# Patient Record
Sex: MALE | Race: ASIAN | Employment: FULL TIME | URBAN - METROPOLITAN AREA
[De-identification: names, ages, dates, MRNs, and addresses within clinical notes are randomized per-mention and may not be internally consistent; named-entity substitution may affect disease eponyms.]

---

## 2017-12-22 ENCOUNTER — RADIANT APPOINTMENT (OUTPATIENT)
Dept: MRI IMAGING | Facility: CLINIC | Age: 22
End: 2017-12-22
Attending: PHYSICIAN ASSISTANT
Payer: COMMERCIAL

## 2017-12-22 DIAGNOSIS — M54.2 NECK PAIN: ICD-10-CM

## 2021-01-06 PROBLEM — N64.89 FULLNESS OF BREAST: Status: ACTIVE | Noted: 2020-12-24

## 2021-01-07 ENCOUNTER — OFFICE VISIT (OUTPATIENT)
Dept: FAMILY MEDICINE | Facility: CLINIC | Age: 26
End: 2021-01-07
Payer: COMMERCIAL

## 2021-01-07 VITALS
BODY MASS INDEX: 19.7 KG/M2 | OXYGEN SATURATION: 98 % | TEMPERATURE: 97 F | HEIGHT: 69 IN | RESPIRATION RATE: 18 BRPM | SYSTOLIC BLOOD PRESSURE: 124 MMHG | DIASTOLIC BLOOD PRESSURE: 80 MMHG | HEART RATE: 59 BPM | WEIGHT: 133 LBS

## 2021-01-07 DIAGNOSIS — N64.4 BREAST PAIN, LEFT: Primary | ICD-10-CM

## 2021-01-07 DIAGNOSIS — Z23 NEED FOR PROPHYLACTIC VACCINATION AND INOCULATION AGAINST INFLUENZA: ICD-10-CM

## 2021-01-07 DIAGNOSIS — N64.89 FULLNESS OF BREAST: ICD-10-CM

## 2021-01-07 PROCEDURE — 99203 OFFICE O/P NEW LOW 30 MIN: CPT | Mod: 25 | Performed by: STUDENT IN AN ORGANIZED HEALTH CARE EDUCATION/TRAINING PROGRAM

## 2021-01-07 PROCEDURE — 90471 IMMUNIZATION ADMIN: CPT | Performed by: STUDENT IN AN ORGANIZED HEALTH CARE EDUCATION/TRAINING PROGRAM

## 2021-01-07 PROCEDURE — 90686 IIV4 VACC NO PRSV 0.5 ML IM: CPT | Performed by: STUDENT IN AN ORGANIZED HEALTH CARE EDUCATION/TRAINING PROGRAM

## 2021-01-07 RX ORDER — MULTIPLE VITAMINS W/ MINERALS TAB 9MG-400MCG
1 TAB ORAL DAILY
COMMUNITY
End: 2022-03-10

## 2021-01-07 ASSESSMENT — MIFFLIN-ST. JEOR: SCORE: 1565.72

## 2021-01-07 NOTE — PROGRESS NOTES
"  Assessment & Plan     Need for prophylactic vaccination and inoculation against influenza  - INFLUENZA VACCINE IM > 6 MONTHS VALENT IIV4 [21574]    Breast pain, left  Fullness of breast  Ongoing left pectoral fullness and intermittent discomfort in the pectoral area since one month ago. He is primarily concerned about gynecomastia. I can appreciate some minor fullness of the left lower pectoral region lateral to the areola. I did not palpate a discrete mass today. Hormonal testing has thus far been unremarkable. His exam is not consistent with classic gynecomastia, however cannot rule this out. Will proceed with breast imaging. Further management pending imaging results.   - US Breast Left Complete 4 Quadrants  - HCG tumor marker  - Estradiol      Review of external notes as documented above     40 minutes spent on the date of the encounter doing chart review, history and exam, documentation and further activities as noted above    Mary Gordillo MD  [facility]    Ayesha Ruiz is a 25 year old who presents to clinic today for the following health issues:    HPI     Pectoral concern: One month ago, patient noted swelling around his left pectoral muscle.   At first area seemed as if it was \"contouring out\", and then \"pressure\" began.  He has felt an uncomfortable pressure in the pectoral muscle intermittently.   He was seen by [facility] [place] on 12/16 and had lab work completed--prolactin, CBC, testosterone, gonadotropin, LH, TSH--that were unremarkable.  He lacked insurance at that time so didn't complete imaging but he would like that done now.  Estrogen level is pending.   Since then, the pressure sensation has mostly resolved but he still notes it on occasion. It is unrelated to exercise.   He has not had shortness of breath, exertional chest pain, fever, leg swelling, vomiting.   He has had intermittent nausea for a couple weeks unrelated to the pressure " "episodes.   He also feels a \"band of tissue\" running through the area of swelling.  He is worried about gynecomastia.  No testicular pain or swelling.        Objective    /80   Pulse 59   Temp 97  F (36.1  C) (Tympanic)   Resp 18   Ht 1.74 m (5' 8.5\")   Wt 60.3 kg (133 lb)   SpO2 98%   BMI 19.93 kg/m    Body mass index is 19.93 kg/m .  Physical Exam   GENERAL: healthy, alert and no distress  EYES: Eyes grossly normal to inspection, PERRL and conjunctivae and sclerae normal  HENT: ear canals and TM's normal, nose and mouth without ulcers or lesions  NECK: no adenopathy, no asymmetry, masses, or scars and thyroid normal to palpation  RESP: lungs clear to auscultation - no rales, rhonchi or wheezes  CV: regular rate and rhythm, normal S1 S2, no S3 or S4, no murmur, click or rub, no peripheral edema and peripheral pulses strong  ABDOMEN: soft, nontender, no hepatosplenomegaly, no masses and bowel sounds normal  MS: no gross musculoskeletal defects noted, no edema  SKIN: no suspicious lesions or rashes  NEURO: Normal strength and tone, mentation intact and speech normal  PSYCH: mentation appears normal, affect normal/bright    Outside labs reviewed and normal: prolactin, CBC, testosterone, gonadotropin, LH, TSH.     "

## 2021-01-12 ENCOUNTER — HOSPITAL ENCOUNTER (OUTPATIENT)
Dept: LAB | Facility: CLINIC | Age: 26
End: 2021-01-12
Attending: STUDENT IN AN ORGANIZED HEALTH CARE EDUCATION/TRAINING PROGRAM
Payer: COMMERCIAL

## 2021-01-12 ENCOUNTER — HOSPITAL ENCOUNTER (OUTPATIENT)
Dept: MAMMOGRAPHY | Facility: CLINIC | Age: 26
End: 2021-01-12
Attending: STUDENT IN AN ORGANIZED HEALTH CARE EDUCATION/TRAINING PROGRAM
Payer: COMMERCIAL

## 2021-01-12 DIAGNOSIS — N64.4 BREAST PAIN, LEFT: ICD-10-CM

## 2021-01-12 LAB — ESTRADIOL SERPL-MCNC: 33 PG/ML (ref 6–50)

## 2021-01-12 PROCEDURE — 82670 ASSAY OF TOTAL ESTRADIOL: CPT | Performed by: STUDENT IN AN ORGANIZED HEALTH CARE EDUCATION/TRAINING PROGRAM

## 2021-01-12 PROCEDURE — 36415 COLL VENOUS BLD VENIPUNCTURE: CPT | Performed by: STUDENT IN AN ORGANIZED HEALTH CARE EDUCATION/TRAINING PROGRAM

## 2021-01-12 PROCEDURE — 84702 CHORIONIC GONADOTROPIN TEST: CPT | Performed by: STUDENT IN AN ORGANIZED HEALTH CARE EDUCATION/TRAINING PROGRAM

## 2021-01-12 PROCEDURE — 76642 ULTRASOUND BREAST LIMITED: CPT | Mod: LT

## 2021-01-12 NOTE — LETTER
Javy Ruiz  [address] [place] [address]            January 12, 2021    Date of Exam:     Dear Javy:    Thank you for your recent visit.     Breast Imaging Result: We are pleased to inform you that the results of your recent breast imaging show no evidence of malignancy (cancer). Please check with your health care team for instructions on follow-up based on your medical history and physical exam findings.    Remember that negative breast imaging does not exclude the possibility of breast disease.  You should never ignore a breast lump or any other change in your breasts, even if the breast imaging is normal.  If you are experiencing any breast problems such as a lump or localized pain we request that you discuss this with your health care team if you haven t already done so, as additional testing may be necessary.    A report of your breast imaging results was sent to: Mary Gordillo    Your breast imaging will become part of your medical file here at [facility] for at least 10 years. You are responsible for informing any new health care team or breast imaging facility of the date and location of this examination.     We appreciate the opportunity to participate in your health care.    Sincerely,  Tommy San MD  [facility]

## 2021-01-12 NOTE — RESULT ENCOUNTER NOTE
Leonard Palafox,    Ultrasound results look good. Looks like they saw a collection of adipose tissue where that lump is that you had noticed but no gynecomastia. I would recommend ongoing observation at this point. If you are still having chest discomfort, though, then I think you should come back into clinic to have that re assessed. Please schedule an appointment in that case. Let us know if you have questions.    Mary Gordillo MD

## 2021-01-13 LAB — HCG-TM SERPL-ACNC: <3 IU/L

## 2021-01-15 ENCOUNTER — HEALTH MAINTENANCE LETTER (OUTPATIENT)
Age: 26
End: 2021-01-15

## 2021-01-21 ENCOUNTER — MYC MEDICAL ADVICE (OUTPATIENT)
Dept: FAMILY MEDICINE | Facility: CLINIC | Age: 26
End: 2021-01-21

## 2021-01-21 DIAGNOSIS — N64.89 FULLNESS OF BREAST: ICD-10-CM

## 2021-01-21 DIAGNOSIS — E34.9 ABNORMALITY OF HORMONE: Primary | ICD-10-CM

## 2021-01-26 DIAGNOSIS — E34.9 ABNORMALITY OF HORMONE: ICD-10-CM

## 2021-01-26 DIAGNOSIS — N64.89 FULLNESS OF BREAST: ICD-10-CM

## 2021-01-26 LAB
ESTRADIOL SERPL-MCNC: 37 PG/ML (ref 6–50)
LH SERPL-ACNC: 7.9 IU/L (ref 1.5–9.3)

## 2021-01-26 PROCEDURE — 82670 ASSAY OF TOTAL ESTRADIOL: CPT | Mod: 90 | Performed by: PATHOLOGY

## 2021-01-26 PROCEDURE — 83002 ASSAY OF GONADOTROPIN (LH): CPT | Mod: 90 | Performed by: PATHOLOGY

## 2021-01-26 PROCEDURE — 99000 SPECIMEN HANDLING OFFICE-LAB: CPT | Performed by: PATHOLOGY

## 2021-01-26 PROCEDURE — 36415 COLL VENOUS BLD VENIPUNCTURE: CPT | Performed by: PATHOLOGY

## 2021-01-26 PROCEDURE — 82672 ASSAY OF ESTROGEN: CPT | Mod: 90 | Performed by: PATHOLOGY

## 2021-02-05 LAB — LAB SCANNED RESULT: NORMAL

## 2021-02-05 NOTE — RESULT ENCOUNTER NOTE
Hi Javy,    Labs have returned and all are in normal range. Are you still having symptoms? Have things improved at all? Please let me know.    Mary Gordillo MD

## 2021-02-15 ENCOUNTER — TELEPHONE (OUTPATIENT)
Dept: FAMILY MEDICINE | Facility: CLINIC | Age: 26
End: 2021-02-15

## 2021-02-15 DIAGNOSIS — N64.4 BREAST PAIN, LEFT: ICD-10-CM

## 2021-02-15 DIAGNOSIS — N64.89 FULLNESS OF BREAST: Primary | ICD-10-CM

## 2021-02-15 NOTE — TELEPHONE ENCOUNTER
Please call patient and let him know I have referred him to the breast specialty center in [place] for consult due to ongoing symptoms.    Thank you.

## 2021-02-15 NOTE — TELEPHONE ENCOUNTER
Pt called today requesting lab results. He was unable to view the total estrogen result. I informed him of result and note from Dr Gordillo.  Pt states he is still having symptoms.  I informed I would let Dr Gordillo know.   casa robert

## 2021-02-16 ENCOUNTER — TELEPHONE (OUTPATIENT)
Dept: ONCOLOGY | Facility: CLINIC | Age: 26
End: 2021-02-16

## 2021-02-16 NOTE — TELEPHONE ENCOUNTER
Oncology/Surgical Oncology Referral Request:     Specialty Requested: Medical Oncology     Referring Provider: Mary Trevino MD    Referring Clinic/Organization: [facility]    Records location: [facility]     Requested Provider (if specified): Not Specified

## 2021-02-16 NOTE — TELEPHONE ENCOUNTER
Left message to call back and ask to speak with an available triage nurse.    Scheduling number for [facility] [place], [place]: [phone].    MANUELA Valente, RN  [facility]

## 2021-02-17 NOTE — TELEPHONE ENCOUNTER
Attempted to reach, unable. Left message  to call back if questions.  He did make appt at breast center for 3/4 which indicates he got RN Yulia's message and made the appt    ViaBillt message sent to pt as well      Mini Osei RN

## 2021-03-04 ENCOUNTER — PRE VISIT (OUTPATIENT)
Dept: ONCOLOGY | Facility: CLINIC | Age: 26
End: 2021-03-04

## 2021-09-13 ENCOUNTER — VIRTUAL VISIT (OUTPATIENT)
Dept: FAMILY MEDICINE | Facility: CLINIC | Age: 26
End: 2021-09-13
Payer: COMMERCIAL

## 2021-09-13 DIAGNOSIS — U07.1 INFECTION DUE TO 2019 NOVEL CORONAVIRUS: Primary | ICD-10-CM

## 2021-09-13 PROCEDURE — 99212 OFFICE O/P EST SF 10 MIN: CPT | Mod: TEL | Performed by: FAMILY MEDICINE

## 2021-09-13 NOTE — PROGRESS NOTES
Javy is a 26 year old who is being evaluated via a billable telephone visit.      What phone number would you like to be contacted at? [phone]  [facility]    Assessment & Plan   1. Infection due to 2019 novel coronavirus: I called patient.  Symptoms improving overall.  Does have headache currently is new symptom.  Manageable with Tylenol and ibuprofen.  Questions answered.  Recommend 10-day quarantine.  As recommended by CDC.  Discourage repeat testing.  Recommend self isolation.  No known diet that helps with Covid.  Likely will bump his immunity; however, but for how long or to what variance is unknown yet.  Discussed typical scolded symptoms and red flag symptoms when to go to the ER.  Patient appreciative.  Follow-up as needed.    Return in about 1 week (around 9/20/2021), or if symptoms worsen or fail to improve.    Jaswinder Moise MD  [facility]    This chart is completed utilizing dictation software; typos and/or incorrect word substitutions may unintentionally occur.       Subjective   Javy is a 26 year old who presents for the following health issues:    HPI   Chief Complaint   Patient presents with     Covid Concern     patient tested positive for covid 9/10/2021 = would like to discuss      Concern for COVID-19 - Tested Positive 9/10/2021  About how many days ago did these symptoms start? 9/9/2021  Is this your first visit for this illness? Yes  In the 14 days before your symptoms started, have you had close contact with someone with COVID-19 (Coronavirus)? I do not know.  Do you have a fever or chills? No  Are you having new or worsening difficulty breathing? No  Do you have new or worsening cough? No does have a cough , not worsening   Have you had any new or unexplained body aches? No    Have you experienced any of the following NEW symptoms?    Headache: YES    Sore throat: No    Loss of taste or smell: No    Chest pain:  No    Diarrhea: No    Rash: No  What treatments have you tried? Tylenol , Ibuprofen ( stopped)   Who do you live with? Friend   Are you, or a household member, a healthcare worker or a ? No  Do you live in a nursing home, group home, or shelter? No  Do you have a way to get food/medications if quarantined? Yes, I have a friend or family member who can help me.    Timeline:    Started getting a cough last Thursday. That night he had trouble sleeping, feeling ill with a fever. Got tested with saliva sample on Saturday. Got his positive results and quarantining. Getting better on Friday. General fatigue Sunday, but now has headache.     Vaccinated with Demetrio and Demetrio    Questions:    What he should be expecting in terms of symptoms -     Any innocent symptoms to watch out for -     Return to work -     Diet to help -    When to go to the ER -    Review of Systems   Constitutional, HEENT, cardiovascular, pulmonary, gi and gu systems are negative, except as otherwise noted.      Objective       Vitals:  No vitals were obtained today due to virtual visit.    Physical Exam   healthy, alert and no distress  PSYCH: Alert and oriented times 3; coherent speech, normal   rate and volume, able to articulate logical thoughts, able   to abstract reason, no tangential thoughts, no hallucinations   or delusions  His affect is normal  RESP: No cough, no audible wheezing, able to talk in full sentences  Remainder of exam unable to be completed due to telephone visits    Labs: none        Phone call duration: 13 minutes

## 2021-10-09 ENCOUNTER — HEALTH MAINTENANCE LETTER (OUTPATIENT)
Age: 26
End: 2021-10-09

## 2022-01-29 ENCOUNTER — HEALTH MAINTENANCE LETTER (OUTPATIENT)
Age: 27
End: 2022-01-29

## 2022-02-28 ENCOUNTER — HOSPITAL ENCOUNTER (EMERGENCY)
Facility: CLINIC | Age: 27
Discharge: HOME | End: 2022-03-01
Attending: EMERGENCY MEDICINE | Admitting: EMERGENCY MEDICINE
Payer: COMMERCIAL

## 2022-02-28 ENCOUNTER — HOSPITAL ENCOUNTER (OUTPATIENT)
Dept: ULTRASOUND IMAGING | Facility: CLINIC | Age: 27
Discharge: HOME | End: 2022-02-28
Attending: INTERNAL MEDICINE | Admitting: INTERNAL MEDICINE
Payer: COMMERCIAL

## 2022-02-28 ENCOUNTER — OFFICE VISIT (OUTPATIENT)
Dept: FAMILY MEDICINE | Facility: CLINIC | Age: 27
End: 2022-02-28
Payer: COMMERCIAL

## 2022-02-28 VITALS
HEART RATE: 59 BPM | SYSTOLIC BLOOD PRESSURE: 132 MMHG | BODY MASS INDEX: 20.29 KG/M2 | WEIGHT: 137 LBS | DIASTOLIC BLOOD PRESSURE: 86 MMHG | RESPIRATION RATE: 16 BRPM | HEIGHT: 69 IN | TEMPERATURE: 99.1 F | OXYGEN SATURATION: 100 %

## 2022-02-28 VITALS
HEART RATE: 71 BPM | TEMPERATURE: 98.3 F | BODY MASS INDEX: 20.23 KG/M2 | OXYGEN SATURATION: 99 % | DIASTOLIC BLOOD PRESSURE: 92 MMHG | WEIGHT: 135 LBS | SYSTOLIC BLOOD PRESSURE: 150 MMHG | RESPIRATION RATE: 16 BRPM

## 2022-02-28 DIAGNOSIS — R10.31 RIGHT GROIN PAIN: ICD-10-CM

## 2022-02-28 DIAGNOSIS — R19.09 RIGHT GROIN MASS: ICD-10-CM

## 2022-02-28 DIAGNOSIS — R10.9 ABDOMINAL WALL PAIN: ICD-10-CM

## 2022-02-28 DIAGNOSIS — R19.09 RIGHT GROIN MASS: Primary | ICD-10-CM

## 2022-02-28 PROCEDURE — 99282 EMERGENCY DEPT VISIT SF MDM: CPT | Performed by: EMERGENCY MEDICINE

## 2022-02-28 PROCEDURE — 99214 OFFICE O/P EST MOD 30 MIN: CPT | Performed by: INTERNAL MEDICINE

## 2022-02-28 PROCEDURE — 76705 ECHO EXAM OF ABDOMEN: CPT

## 2022-02-28 ASSESSMENT — PAIN SCALES - GENERAL: PAINLEVEL: MODERATE PAIN (5)

## 2022-02-28 NOTE — PROGRESS NOTES
Assessment & Plan   Problem List Items Addressed This Visit     None      Visit Diagnoses     Right groin mass    -  Primary    Relevant Orders    US Abdomen Limited    Adult General Surg Referral    Right groin pain        Relevant Orders    US Abdomen Limited    Adult General Surg Referral         Advised patient to go to the ER where he would rather wait on that for now, advised if the pain becomes constant persistent increasing  advised him to go immediately to the ER for further evaluation to rule out incarcerated hernia which is a possibility on the differential if symptoms worsen or persist.  Referral placed to general surgery is urgent, also an ultrasound was placed as urgent.               Work on weight loss  Regular exercise  See Patient Instructions    Return if symptoms worsen or fail to improve, for As needed and if symptoms worsen, other, go to ED.    Freddie Nieves MD  [facility] TARA Calderon is a 26 year old who presents for the following health issues  History of Present Illness     Reason for visit:  Pain in inguinal area on right side.  Symptom onset:  3-7 days ago  Symptoms include:  Pain and feeling a lump in inguinal area.  Symptom intensity:  Mild  Symptom progression:  Worsening  Had these symptoms before:  No  What makes it worse:  Pressing on the area or stretching the area.  What makes it better:  No.    He eats 0-1 servings of fruits and vegetables daily.He consumes 2 sweetened beverage(s) daily.He exercises with enough effort to increase his heart rate 10 to 19 minutes per day.  He exercises with enough effort to increase his heart rate 5 days per week.   He is taking medications regularly.       Presenting for evaluation of right lower abdominal pain started last Thursday and some local swelling, describes the pain is gradually increase nonradiating urine to the lower groin area and upper side.  No hurts with moving his right leg.  Denies any  "associated GI bowel movement changes.  No constipation or blood in the stools.  The area is tender only on palpation gets a 6-7 out of 10 sitting without palpating the areas around is negligible.  Review of Systems   Constitutional, HEENT, cardiovascular, pulmonary, gi and gu systems are negative, except as otherwise noted.      Objective    /86 (BP Location: Right arm, Patient Position: Chair, Cuff Size: Adult Regular)   Pulse 59   Temp 99.1  F (37.3  C) (Temporal)   Resp 16   Ht 1.74 m (5' 8.5\")   Wt 62.1 kg (137 lb)   SpO2 100%   BMI 20.53 kg/m    Body mass index is 20.53 kg/m .  Physical Exam   General appearance not in acute distress alert oriented x3  Lungs clear to auscultation  Heart regular rate and rhythm  Abdomen is soft nontender non distended, no mass palpated  Groin area on the right side there is a bulge that can feel it bulges with Valsalva maneuver.  Is slightly tender to palpation.   : No scrotal swelling  No testicular  masses    Orders Only on 01/26/2021   Component Date Value Ref Range Status     Lutropin 01/26/2021 7.9  1.5 - 9.3 IU/L Final     Estradiol 01/26/2021 37  6 - 50 pg/mL Final     Lab Scanned Result 01/26/2021 ESTROGENS,TOTAL-Scanned   Final               "

## 2022-03-01 ASSESSMENT — ENCOUNTER SYMPTOMS
APPETITE CHANGE: 0
DYSURIA: 0
ABDOMINAL PAIN: 1
VOMITING: 0
MYALGIAS: 1

## 2022-03-01 NOTE — ED PROVIDER NOTES
[facility] EMERGENCY DEPARTMENT ([facility])  2/28/22  History     Chief Complaint   Patient presents with     Abdominal Pain     Swelling to  lower abdomen x 4 days. Was seen at clinic this morning, ultrasound showed no hernia per pt. Increased swelling and pain since this morning.      The history is provided by the patient.     Javy Ruiz is a 26 year old male who presents to the Emergency Department with c/o  increased right-sided lower abdominal pain and swelling in his abdomen for 4 days. He reports that he presented to clinic on 2/28/21, ultrasound showed no hernia. He states he can feel the pain when moving his right leg or leaning backward.  He also reports experiencing the pain when he is touching the area.  He denies experiencing pain in the area here in the ED.  He denies experiencing dysuria, testicular pain, or penile discharge. He denies any history of STD. He denies any change in his bowel movements, he denies diarrhea.  He denies experiencing vomiting or loss of appetite. He denies any recent exercise, strain, or sexual activity.   symptoms.  He denies taking anything for pain.  He is s/p appendectomy    Patient states that he presented to urgent.  His  provider administered an ultrasound, which was negative for hernia or lymph swelling (imaging results are copied below).     US ABDOMEN LIMITED 3/1/2022:   FINDINGS:  Ultrasound of the right groin with and without Valsalva technique shows no underlying sonographic abnormality. Specifically no hernia or lymphadenopathy.    Past Medical History  History reviewed. No pertinent past medical history.  Past Surgical History:   Procedure Laterality Date     APPENDECTOMY       Acetaminophen (TYLENOL PO)  multivitamin w/minerals (MULTI-VITAMIN) tablet      No Known Allergies  Family History  Family History   Problem Relation Age of Onset     Hypothyroidism Mother      Other - See Comments Mother         breast tumor      Coronary Artery Disease  Father      Hypertension Father      Hyperlipidemia Father      Myocardial Infarction Father      Heart Surgery Father         angioplasty     Breast Cancer Maternal Grandmother      Prostate Cancer Maternal Grandfather      Throat cancer Paternal Grandfather      Social History   Social History     Tobacco Use     Smoking status: Never Smoker     Smokeless tobacco: Never Used   Vaping Use     Vaping Use: Never used   Substance Use Topics     Alcohol use: Not Currently     Drug use: Never      Past medical history, past surgical history, medications, allergies, family history, and social history were reviewed with the patient. No additional pertinent items.     I have reviewed the Medications, Allergies, Past Medical and Surgical History, and Social History in the Epic system.    Review of Systems   Constitutional: Negative for appetite change.   Gastrointestinal: Positive for abdominal pain. Negative for vomiting.   Genitourinary: Negative for dysuria, penile discharge and testicular pain.   Musculoskeletal: Positive for myalgias.     A complete review of systems was performed with pertinent positives and negatives noted in the HPI, and all other systems negative.    Physical Exam   BP: (!) 150/92  Pulse: 71  Temp: 98.3  F (36.8  C)  Resp: 16  Weight: 61.2 kg (135 lb)  SpO2: 99 %      Physical Exam  Vitals and nursing note reviewed.   Constitutional:       General: He is not in acute distress.     Appearance: He is well-developed.   HENT:      Head: Normocephalic.      Right Ear: External ear normal.      Left Ear: External ear normal.      Nose: Nose normal.   Eyes:      Extraocular Movements: Extraocular movements intact.      Conjunctiva/sclera: Conjunctivae normal.   Pulmonary:      Effort: Pulmonary effort is normal. No respiratory distress.   Abdominal:      General: Abdomen is flat. There is no distension.      Comments: Small area of point tenderness right lower quadrant no surrounding abdominal tenderness  or peritoneal signs   Musculoskeletal:         General: No deformity. Normal range of motion.      Cervical back: Normal range of motion and neck supple.   Skin:     General: Skin is warm and dry.   Neurological:      Mental Status: He is alert. Mental status is at baseline.      Comments: Oriented   Psychiatric:         Mood and Affect: Mood normal.         Behavior: Behavior normal.         ED Course     At 12:06 AM the patient was seen and examined by Zhang Parish DO in Room ED03.        Procedures         Results for orders placed or performed during the hospital encounter of 02/28/22 (from the past 24 hour(s))   US Abdomen Limited    Narrative    EXAM: US ABDOMEN LIMITED  LOCATION: [facility]  DATE/TIME: 2/28/2022 6:51 PM    INDICATION:  Right groin mass, Right groin pain. Possible hernia.  COMPARISON: None.  TECHNIQUE: Limited abdominal ultrasound.    FINDINGS:  Ultrasound of the right groin with and without Valsalva technique shows no underlying sonographic abnormality. Specifically no hernia or lymphadenopathy.     Medications - No data to display          Assessments & Plan (with Medical Decision Making)   Javy Ruiz is a 26 year old male who presents to us with several days of right lower quadrant abdominal pain.  Differential includes but not limited to urinary issue, testicular issue, abdominal wall pain, hernia.  Patient has had an appendectomy in the past.  Exam shows no evidence of hernia.  The patient previously had an ultrasound that was reviewed and was unremarkable.  He was seen a few days ago when the ultrasound was ordered and these records are reviewed as well.  Patient has no urinary symptoms testicular persistent symptoms, discharge or testicular pain.  Patient has no pain on rest only during palpation.  The tender area is quite small and localized and appears to correspond with an abdominal wall muscle.  Patient has no other systemic symptoms such as nausea  vomiting diarrhea fever to suggest a intra-abdominal process.  The fact that he has no pain without palpation is also reassuring.  Recommend over-the-counter NSAIDs and follow-up with primary care as needed.  I have reviewed the nursing notes.    I have reviewed the findings, diagnosis, plan and need for follow up with the patient.    Discharge Medication List as of 3/1/2022 12:44 AM          Final diagnoses:   Abdominal wall pain       I, Hernando James am serving as a trained medical scribe to document services personally performed by Zhang Parish, based on the provider's statements to me.      I, Zhang Parish, was physically present and have reviewed and verified the accuracy of this note documented by Hernando James.     Zhang Parish, DO  2/28/2022   [facility] EMERGENCY DEPARTMENT     Zhang Parish, DO  03/01/22 0115       Zhang Parish,   03/01/22 0115

## 2022-03-01 NOTE — DISCHARGE INSTRUCTIONS
Given the specific location of your pain and the lack of other symptoms such as fever, nausea, vomiting, diarrhea I have a low suspicion for an intra-abdominal problem.  I suspect your pain is abdominal wall muscle.  I recommend Tylenol or ibuprofen as needed and follow-up with primary care.

## 2022-03-02 ENCOUNTER — MYC MEDICAL ADVICE (OUTPATIENT)
Dept: FAMILY MEDICINE | Facility: CLINIC | Age: 27
End: 2022-03-02
Payer: COMMERCIAL

## 2022-03-02 DIAGNOSIS — R10.31 ABDOMINAL PAIN, RIGHT LOWER QUADRANT: Primary | ICD-10-CM

## 2022-03-03 ENCOUNTER — LAB (OUTPATIENT)
Dept: LAB | Facility: CLINIC | Age: 27
End: 2022-03-03
Payer: COMMERCIAL

## 2022-03-03 DIAGNOSIS — R10.31 ABDOMINAL PAIN, RIGHT LOWER QUADRANT: ICD-10-CM

## 2022-03-03 LAB
ALBUMIN UR-MCNC: NEGATIVE MG/DL
APPEARANCE UR: CLEAR
BASOPHILS # BLD AUTO: 0.1 10E3/UL (ref 0–0.2)
BASOPHILS NFR BLD AUTO: 1 %
BILIRUB UR QL STRIP: NEGATIVE
COLOR UR AUTO: YELLOW
EOSINOPHIL # BLD AUTO: 0.5 10E3/UL (ref 0–0.7)
EOSINOPHIL NFR BLD AUTO: 7 %
ERYTHROCYTE [DISTWIDTH] IN BLOOD BY AUTOMATED COUNT: 12.4 % (ref 10–15)
GLUCOSE UR STRIP-MCNC: NEGATIVE MG/DL
HCT VFR BLD AUTO: 41.4 % (ref 40–53)
HGB BLD-MCNC: 14.6 G/DL (ref 13.3–17.7)
HGB UR QL STRIP: NEGATIVE
IMM GRANULOCYTES # BLD: 0 10E3/UL
IMM GRANULOCYTES NFR BLD: 0 %
KETONES UR STRIP-MCNC: NEGATIVE MG/DL
LEUKOCYTE ESTERASE UR QL STRIP: NEGATIVE
LYMPHOCYTES # BLD AUTO: 2.2 10E3/UL (ref 0.8–5.3)
LYMPHOCYTES NFR BLD AUTO: 30 %
MCH RBC QN AUTO: 29.4 PG (ref 26.5–33)
MCHC RBC AUTO-ENTMCNC: 35.3 G/DL (ref 31.5–36.5)
MCV RBC AUTO: 83 FL (ref 78–100)
MONOCYTES # BLD AUTO: 0.6 10E3/UL (ref 0–1.3)
MONOCYTES NFR BLD AUTO: 8 %
NEUTROPHILS # BLD AUTO: 4 10E3/UL (ref 1.6–8.3)
NEUTROPHILS NFR BLD AUTO: 54 %
NITRATE UR QL: NEGATIVE
PH UR STRIP: 8.5 [PH] (ref 5–7)
PLATELET # BLD AUTO: 329 10E3/UL (ref 150–450)
RBC # BLD AUTO: 4.97 10E6/UL (ref 4.4–5.9)
RBC #/AREA URNS AUTO: NORMAL /HPF
SP GR UR STRIP: 1.02 (ref 1–1.03)
UROBILINOGEN UR STRIP-ACNC: 0.2 E.U./DL
WBC # BLD AUTO: 7.4 10E3/UL (ref 4–11)
WBC #/AREA URNS AUTO: NORMAL /HPF

## 2022-03-03 PROCEDURE — 36415 COLL VENOUS BLD VENIPUNCTURE: CPT

## 2022-03-03 PROCEDURE — 85025 COMPLETE CBC W/AUTO DIFF WBC: CPT

## 2022-03-03 PROCEDURE — 86140 C-REACTIVE PROTEIN: CPT

## 2022-03-03 PROCEDURE — 81001 URINALYSIS AUTO W/SCOPE: CPT

## 2022-03-03 PROCEDURE — 80053 COMPREHEN METABOLIC PANEL: CPT

## 2022-03-04 LAB — CRP SERPL-MCNC: <2.9 MG/L (ref 0–8)

## 2022-03-04 NOTE — TELEPHONE ENCOUNTER
Please see patient's mychart:    patient following up on results from 3/3/22  Specifically PH.  No lab result note in, please advise.     Please reply back to patient, route to triage follow up, or route to team coordinators to have patient schedule an appointment.     Meenu Gonzales RN  [facility]

## 2022-03-05 LAB
ALBUMIN SERPL-MCNC: 4.4 G/DL (ref 3.4–5)
ALP SERPL-CCNC: 77 U/L (ref 40–150)
ALT SERPL W P-5'-P-CCNC: 41 U/L (ref 0–70)
ANION GAP SERPL CALCULATED.3IONS-SCNC: 5 MMOL/L (ref 3–14)
AST SERPL W P-5'-P-CCNC: 20 U/L (ref 0–45)
BILIRUB SERPL-MCNC: 0.4 MG/DL (ref 0.2–1.3)
BUN SERPL-MCNC: 18 MG/DL (ref 7–30)
CALCIUM SERPL-MCNC: 9.4 MG/DL (ref 8.5–10.1)
CHLORIDE BLD-SCNC: 106 MMOL/L (ref 94–109)
CO2 SERPL-SCNC: 27 MMOL/L (ref 20–32)
CREAT SERPL-MCNC: 0.99 MG/DL (ref 0.66–1.25)
GFR SERPL CREATININE-BSD FRML MDRD: >90 ML/MIN/1.73M2
GLUCOSE BLD-MCNC: 77 MG/DL (ref 70–99)
POTASSIUM BLD-SCNC: 4.1 MMOL/L (ref 3.4–5.3)
PROT SERPL-MCNC: 8.3 G/DL (ref 6.8–8.8)
SODIUM SERPL-SCNC: 138 MMOL/L (ref 133–144)

## 2022-03-10 ENCOUNTER — OFFICE VISIT (OUTPATIENT)
Dept: FAMILY MEDICINE | Facility: CLINIC | Age: 27
End: 2022-03-10
Payer: COMMERCIAL

## 2022-03-10 VITALS
TEMPERATURE: 97.4 F | DIASTOLIC BLOOD PRESSURE: 87 MMHG | BODY MASS INDEX: 19.99 KG/M2 | HEIGHT: 69 IN | HEART RATE: 60 BPM | SYSTOLIC BLOOD PRESSURE: 134 MMHG | RESPIRATION RATE: 18 BRPM | WEIGHT: 135 LBS | OXYGEN SATURATION: 97 %

## 2022-03-10 DIAGNOSIS — R10.31 RIGHT LOWER QUADRANT PAIN: Primary | ICD-10-CM

## 2022-03-10 PROCEDURE — 99213 OFFICE O/P EST LOW 20 MIN: CPT | Performed by: INTERNAL MEDICINE

## 2022-03-10 ASSESSMENT — PAIN SCALES - GENERAL: PAINLEVEL: NO PAIN (0)

## 2022-03-10 NOTE — PROGRESS NOTES
Assessment & Plan     Right lower quadrant pain  Complaining of right lower quadrant abdominal pain  Ongoing for the last several weeks  No exacerbating or alleviating factors although ibuprofen has helped  He did notice that there was a bandlike sensation in the right lower quadrant  He had a ultrasound abdomen which was normal  UA was also normal  Blood work was also normal  Initially they thought that this could be hernia but there was no hernia seen on the ultrasound  Examination today showed that there was a 2 inch bandlike structure subcutaneously  Most probably this could be scar tissue  He had a history of appendectomy in the past  This was a laparoscopic  He is concerned about the pain  Reassured that it is unlikely that the pain is coming from any internal organs  - CT Abdomen Pelvis w Contrast; Future      20 minutes spent on the date of the encounter doing chart review, history and exam, documentation and further activities per the note           Return in about 4 weeks (around 4/7/2022), or if symptoms worsen or fail to improve.    Ilya Terna MD  [facility] JOE Calderon is a 26 year old who presents for the following health issues     HPI     Pain History:    Where in your body do you have pain? Abdominal/Flank Pain  Onset/Duration: about 2 weeks ago  Description:   Character: Cramping Ache  Location: right lower quadrant  Radiation: None  Intensity: 0/10  Progression of Symptoms:  intermittent  Accompanying Signs & Symptoms:  Fever/Chills: no  Gas/Bloating: no  Nausea: no  Vomitting: no  Diarrhea: no  Constipation: no  Dysuria or Hematuria: no  History:   Trauma: no  Previous similar pain: YES  Previous tests done: U/S done on 2/28/22  Precipitating factors:   Does the pain change with:     Food: no    Bowel Movement: no    Urination: no   Other factors:  no  Therapies tried and outcome: None            Review of Systems   Constitutional, HEENT,  "cardiovascular, pulmonary, gi and gu systems are negative, except as otherwise noted.      Objective    /87 (BP Location: Left arm, Patient Position: Chair, Cuff Size: Adult Regular)   Pulse 60   Temp 97.4  F (36.3  C) (Tympanic)   Resp 18   Ht 1.74 m (5' 8.5\")   Wt 61.2 kg (135 lb)   SpO2 97%   BMI 20.23 kg/m    Body mass index is 20.23 kg/m .  Physical Exam   GENERAL: healthy, alert and no distress  EYES: Eyes grossly normal to inspection, PERRL and conjunctivae and sclerae normal  ABDOMEN: There is a vertical band which measures around 3 inches in the right lower quadrant  This is firm and he is present subcutaneously  NEURO: Normal strength and tone, mentation intact and speech normal  PSYCH: mentation appears normal, affect normal/bright                "

## 2022-03-10 NOTE — PATIENT INSTRUCTIONS
At [facility], we strive to deliver an exceptional experience to you, every time we see you. If you receive a survey, please complete it as we do value your feedback.  If you have MyChart, you can expect to receive results automatically within 24 hours of their completion.  Your provider will send a note interpreting your results as well.   If you do not have MyChart, you should receive your results in about a week by mail.    Your care team:                            Family Medicine Internal Medicine   MD Dashawn Kilgore MD Shantel Branch-Fleming, MD Srinivasa Vaka, MD Katya Belousova, DULCE LeoHillCALVIN Porter CNP, MD Pediatrics   Jon Shea, MD Sarahi Culver MD Amelia Massimini APRN CNP   Trinidad Leonard APRN ABHAY Billingsley MD             Clinic hours: Monday - Thursday 7 am-6 pm; Fridays 7 am-5 pm.   Urgent care: Monday - Friday 10 am- 8 pm; Saturday and Sunday 9 am-5 pm.    Clinic: [phone]       [facility]: Monday - Thursday 8 am - 7 pm; Friday 8 am - 6 pm  [facility]: [phone]       Patient Education     Abdominal Pain  Abdominal pain is pain in the stomach or belly area. Everyone has this pain from time to time. In many cases it goes away on its own. But abdominal pain can sometimes be due to a serious problem, such as appendicitis. So it s important to know when to get help.    Causes of abdominal pain  There are many possible causes of abdominal pain. Common causes in adults include:    Constipation, diarrhea, or gas    Stomach acid flowing back up into the esophagus (acid reflux or heartburn)    Severe acid reflux, called GERD (gastroesophageal reflux disease)    A sore in the lining of the stomach or small intestine (peptic ulcer)    Inflammation of the gallbladder, liver, or pancreas    Gallstones or kidney  stones    Appendicitis     Intestinal blockage     An internal organ pushing through a muscle or other tissue (hernia)    Urinary tract infections    In women, menstrual cramps, fibroids, ovarian cysts, pelvic inflammatory disease, or endometriosis    Inflammation or infection of the intestines, including Crohn's disease and ulcerative colitis    Irritable bowel syndrome  Diagnosing the cause of abdominal pain  Your healthcare provider will give you a physical exam help find the cause of your pain. If needed, you will have tests. Belly pain has many possible causes. So it can be hard to find the reason for your pain. Giving details about your pain can help. Tell your provider where and when you feel the pain, and what makes it better or worse. Also let your provider know if you have other symptoms such as:    Fever    Tiredness    Upset stomach (nausea)    Vomiting    Changes in bathroom habits    Blood in the stool or black, tarry stool    Weight loss that you can't explain (involuntary weight loss?)  Also report any family history of stomach or intestinal problems, or cancers. Tell your provider about all your alcohol use and drug use. Tell your provider about all medicines you use, including herbs, vitamins, and supplements.  Treating abdominal pain  Some causes of pain need emergency medical treatment right away. These include appendicitis or a bowel blockage. Other problems can be treated with rest, fluids, or medicines. Your healthcare provider can give you specific instructions for treatment or self-care based on what is causing your pain.     If you have vomiting or diarrhea, sip water or other clear fluids. When you are ready to eat solid foods again, start with small amounts of easy-to-digest, low-fat foods. These include apple sauce, toast, or crackers.  When to get medical care  Call 911 or go to the hospital right away if you:    Can t pass stool and are vomiting    Are vomiting blood or have bloody  diarrhea or black, tarry diarrhea    Have chest, neck, or shoulder pain    Feel like you might pass out    Have pain in your shoulder blades with nausea    Have sudden, severe belly pain    Have new, severe pain unlike any you have felt before    Have a belly that is rigid, hard, and hurts to touch  Call your healthcare provider if you have:    Pain for more than 5 days    Bloating for more than 2 days    Diarrhea for more than 5 days    A fever of 100.4 F (38 C) or higher, or as directed by your healthcare provider    Pain that gets worse    Weight loss for no reason    Continued lack of appetite    Blood in your stool  How to prevent abdominal pain  Here are some tips to help prevent abdominal pain:    Eat smaller amounts of food at each meal.    Don't eat greasy, fried, or other high-fat foods.    Don't eat foods that give you gas.    Exercise regularly.    Drink plenty of fluids.  To help prevent GERD symptoms:    Quit smoking.    Reduce alcohol and foods that increase stomach acid.    Don't use aspirin or over-the-counter pain and fever medicines, if possible. This includes nonsteroidal anti-inflammatory drugs (NSAIDs).    Lose excess weight.    Finish eating at least 2 hours before you go to bed or lie down.    Raise the head of your bed.  [employer] last reviewed this educational content on 4/1/2019 2000-2021 [employer]. All rights reserved. This information is not intended as a substitute for professional medical care. Always follow your healthcare professional's instructions.

## 2022-03-11 ENCOUNTER — ANCILLARY PROCEDURE (OUTPATIENT)
Dept: CT IMAGING | Facility: CLINIC | Age: 27
End: 2022-03-11
Attending: INTERNAL MEDICINE
Payer: COMMERCIAL

## 2022-03-11 DIAGNOSIS — R10.31 RIGHT LOWER QUADRANT PAIN: ICD-10-CM

## 2022-03-11 PROCEDURE — 74177 CT ABD & PELVIS W/CONTRAST: CPT | Mod: GC | Performed by: STUDENT IN AN ORGANIZED HEALTH CARE EDUCATION/TRAINING PROGRAM

## 2022-03-11 RX ORDER — IOPAMIDOL 755 MG/ML
82 INJECTION, SOLUTION INTRAVASCULAR ONCE
Status: COMPLETED | OUTPATIENT
Start: 2022-03-11 | End: 2022-03-11

## 2022-03-11 RX ADMIN — IOPAMIDOL 82 ML: 755 INJECTION, SOLUTION INTRAVASCULAR at 17:33

## 2022-09-17 ENCOUNTER — HEALTH MAINTENANCE LETTER (OUTPATIENT)
Age: 27
End: 2022-09-17

## 2023-05-06 ENCOUNTER — HEALTH MAINTENANCE LETTER (OUTPATIENT)
Age: 28
End: 2023-05-06

## 2024-07-13 ENCOUNTER — HEALTH MAINTENANCE LETTER (OUTPATIENT)
Age: 29
End: 2024-07-13